# Patient Record
Sex: MALE | Race: BLACK OR AFRICAN AMERICAN | NOT HISPANIC OR LATINO | URBAN - METROPOLITAN AREA
[De-identification: names, ages, dates, MRNs, and addresses within clinical notes are randomized per-mention and may not be internally consistent; named-entity substitution may affect disease eponyms.]

---

## 2023-06-22 ENCOUNTER — APPOINTMENT (RX ONLY)
Dept: URBAN - METROPOLITAN AREA CLINIC 99 | Facility: CLINIC | Age: 58
Setting detail: DERMATOLOGY
End: 2023-06-22

## 2023-06-22 DIAGNOSIS — L29.8 OTHER PRURITUS: ICD-10-CM

## 2023-06-22 DIAGNOSIS — L81.0 POSTINFLAMMATORY HYPERPIGMENTATION: ICD-10-CM

## 2023-06-22 DIAGNOSIS — L20.89 OTHER ATOPIC DERMATITIS: ICD-10-CM | Status: INADEQUATELY CONTROLLED

## 2023-06-22 DIAGNOSIS — L29.89 OTHER PRURITUS: ICD-10-CM

## 2023-06-22 PROBLEM — L20.84 INTRINSIC (ALLERGIC) ECZEMA: Status: ACTIVE | Noted: 2023-06-22

## 2023-06-22 PROCEDURE — 99203 OFFICE O/P NEW LOW 30 MIN: CPT

## 2023-06-22 PROCEDURE — ? COUNSELING

## 2023-06-22 PROCEDURE — ? PRESCRIPTION MEDICATION MANAGEMENT

## 2023-06-22 PROCEDURE — ? PRESCRIPTION

## 2023-06-22 RX ORDER — PREDNISONE 10 MG/1
TABLET ORAL
Qty: 37 | Refills: 0 | Status: ERX | COMMUNITY
Start: 2023-06-22

## 2023-06-22 RX ORDER — TRIAMCINOLONE ACETONIDE 1 MG/G
OINTMENT TOPICAL BID
Qty: 453.6 | Refills: 1 | Status: ERX | COMMUNITY
Start: 2023-06-22

## 2023-06-22 RX ADMIN — TRIAMCINOLONE ACETONIDE: 1 OINTMENT TOPICAL at 00:00

## 2023-06-22 RX ADMIN — PREDNISONE: 10 TABLET ORAL at 00:00

## 2023-06-22 ASSESSMENT — SEVERITY ASSESSMENT 2020: SEVERITY 2020: MODERATE

## 2023-06-22 ASSESSMENT — LOCATION SIMPLE DESCRIPTION DERM
LOCATION SIMPLE: LEFT UPPER ARM
LOCATION SIMPLE: RIGHT SHOULDER
LOCATION SIMPLE: LEFT UPPER BACK
LOCATION SIMPLE: UPPER BACK
LOCATION SIMPLE: ABDOMEN
LOCATION SIMPLE: RIGHT THIGH
LOCATION SIMPLE: RIGHT UPPER ARM
LOCATION SIMPLE: CHEST
LOCATION SIMPLE: LEFT THIGH

## 2023-06-22 ASSESSMENT — LOCATION DETAILED DESCRIPTION DERM
LOCATION DETAILED: RIGHT MEDIAL SUPERIOR CHEST
LOCATION DETAILED: INFERIOR THORACIC SPINE
LOCATION DETAILED: RIGHT PROXIMAL POSTERIOR UPPER ARM
LOCATION DETAILED: RIGHT ANTERIOR PROXIMAL THIGH
LOCATION DETAILED: EPIGASTRIC SKIN
LOCATION DETAILED: LEFT MEDIAL SUPERIOR CHEST
LOCATION DETAILED: RIGHT POSTERIOR SHOULDER
LOCATION DETAILED: LEFT ANTERIOR PROXIMAL THIGH
LOCATION DETAILED: LEFT ANTERIOR DISTAL THIGH
LOCATION DETAILED: LEFT DISTAL POSTERIOR UPPER ARM
LOCATION DETAILED: LEFT LATERAL UPPER BACK

## 2023-06-22 ASSESSMENT — LOCATION ZONE DERM
LOCATION ZONE: ARM
LOCATION ZONE: TRUNK
LOCATION ZONE: LEG

## 2023-06-22 ASSESSMENT — ITCH NUMERIC RATING SCALE: HOW SEVERE IS YOUR ITCHING?: 10

## 2023-06-22 ASSESSMENT — BSA RASH: BSA RASH: 11

## 2023-06-22 ASSESSMENT — ITCH INTENSITY: HOW SEVERE IS YOUR ITCHING?: 10

## 2023-06-22 NOTE — PROCEDURE: PRESCRIPTION MEDICATION MANAGEMENT
Telephone Encounter by Kacey Skaggs RN at 07/27/17 10:16 AM     Author:  Kacey Skaggs RN Service:  (none) Author Type:  Registered Nurse     Filed:  07/27/17 10:16 AM Encounter Date:  7/24/2017 Status:  Signed     :  Kacey Skaggs RN (Registered Nurse)       From: Kailey Weber  To: Nena Ewing MD  Sent: 7/24/2017  6:51 AM CDT  Subject: Medication Renewal Request    Original authorizing provider: MD Kailey Alexander would like a refill of the following medications:  duloxetine (CYMBALTA) 20 MG Cap [Nena Ewing MD]    Preferred pharmacy: [facility] [address] [facility]    Comment:         Revision History        Date/Time User Provider Type Action    > 07/27/17 10:16 AM Kacey Skaggs RN Registered Nurse Sign    Attribution information within the note text is not available.            
Telephone Encounter by Kacey Skaggs 
Detail Level: Zone
Render In Strict Bullet Format?: No
Initiate Treatment: triamcinolone acetonide 0.1 % topical ointment BID\\nQuantity: 453.6 g  Days Supply: 30\\nSig: Apply to affected area bid prn for flareups. (Body)\\n\\nprednisone 10 mg tablets in a dose pack \\nQuantity: 37.0 Tablet\\nSig: Take 6 tablets day 1, 5 tablets day 2, 4 tablets day 3, 3.5 tablets day 4, 3 tablets day 5, 2.5 tablets days 6 and 7, 2 tablets days 8 and 9, 1.5 tablets days 10 and 11, 1 tablet days 12 and 13, 1/2 tablet days 14 and 15

## 2023-06-22 NOTE — HPI: RASH
What Type Of Note Output Would You Prefer (Optional)?: Standard Output
How Severe Is Your Rash?: moderate
Is This A New Presentation, Or A Follow-Up?: Rash
Additional History: Patient went to the ER first and they thought it was chicken pox. He then followed up with his PCP who diagnosed him with [place]. He has had basic blood allergy testing thinking he was allergic to penicillin but that came back negative. Also has STD testing, was also negative. He does take cool showers, use dove soap.\\n\\nPatient states the intense itching is effecting his sleep and his ability to properly focus at work.

## 2023-06-26 RX ORDER — TRIAMCINOLONE ACETONIDE 1 MG/G
OINTMENT TOPICAL BID
Qty: 453.6 | Refills: 1 | Status: ERX

## 2023-06-26 RX ORDER — PREDNISONE 10 MG/1
TABLET ORAL
Qty: 37 | Refills: 0 | Status: ERX

## 2023-07-20 ENCOUNTER — APPOINTMENT (RX ONLY)
Dept: URBAN - METROPOLITAN AREA CLINIC 99 | Facility: CLINIC | Age: 58
Setting detail: DERMATOLOGY
End: 2023-07-20

## 2023-07-20 DIAGNOSIS — L81.0 POSTINFLAMMATORY HYPERPIGMENTATION: ICD-10-CM

## 2023-07-20 DIAGNOSIS — B07.8 OTHER VIRAL WARTS: ICD-10-CM

## 2023-07-20 DIAGNOSIS — L259 CONTACT DERMATITIS AND OTHER ECZEMA, UNSPECIFIED CAUSE: ICD-10-CM | Status: RESOLVED

## 2023-07-20 PROBLEM — L23.9 ALLERGIC CONTACT DERMATITIS, UNSPECIFIED CAUSE: Status: ACTIVE | Noted: 2023-07-20

## 2023-07-20 PROCEDURE — ? ADDITIONAL NOTES

## 2023-07-20 PROCEDURE — ? TREATMENT REGIMEN

## 2023-07-20 PROCEDURE — 99213 OFFICE O/P EST LOW 20 MIN: CPT | Mod: 25

## 2023-07-20 PROCEDURE — 17110 DESTRUCTION B9 LES UP TO 14: CPT

## 2023-07-20 PROCEDURE — ? LIQUID NITROGEN

## 2023-07-20 PROCEDURE — ? COUNSELING

## 2023-07-20 ASSESSMENT — LOCATION SIMPLE DESCRIPTION DERM
LOCATION SIMPLE: LEFT UPPER ARM
LOCATION SIMPLE: RIGHT SHOULDER
LOCATION SIMPLE: LEFT THIGH
LOCATION SIMPLE: RIGHT THIGH
LOCATION SIMPLE: RIGHT UPPER ARM
LOCATION SIMPLE: ABDOMEN
LOCATION SIMPLE: UPPER BACK
LOCATION SIMPLE: LEFT UPPER BACK
LOCATION SIMPLE: CHEST

## 2023-07-20 ASSESSMENT — LOCATION DETAILED DESCRIPTION DERM
LOCATION DETAILED: LEFT LATERAL UPPER BACK
LOCATION DETAILED: RIGHT ANTERIOR PROXIMAL THIGH
LOCATION DETAILED: RIGHT POSTERIOR SHOULDER
LOCATION DETAILED: LEFT DISTAL POSTERIOR UPPER ARM
LOCATION DETAILED: LEFT ANTERIOR PROXIMAL THIGH
LOCATION DETAILED: RIGHT MEDIAL SUPERIOR CHEST
LOCATION DETAILED: LEFT ANTERIOR DISTAL THIGH
LOCATION DETAILED: RIGHT ANTERIOR MEDIAL PROXIMAL THIGH
LOCATION DETAILED: INFERIOR THORACIC SPINE
LOCATION DETAILED: EPIGASTRIC SKIN
LOCATION DETAILED: RIGHT PROXIMAL POSTERIOR UPPER ARM

## 2023-07-20 ASSESSMENT — LOCATION ZONE DERM
LOCATION ZONE: ARM
LOCATION ZONE: LEG
LOCATION ZONE: TRUNK

## 2023-07-20 NOTE — PROCEDURE: ADDITIONAL NOTES
Detail Level: Simple
Additional Notes: Patient clear today. PIH present post rash. Discussed possible patch testing if symptoms return and become more bothersome. Patient given Dr. Montague’s SAFElist.
Render Risk Assessment In Note?: no

## 2023-07-20 NOTE — PROCEDURE: LIQUID NITROGEN
Consent: The patient's consent was obtained including but not limited to risks of crusting, scabbing, blistering, scarring, darker or lighter pigmentary change, recurrence, incomplete removal and infection.
Medical Necessity Clause: This procedure was medically necessary because the lesions that were treated were:
Add 52 Modifier (Optional): no
Spray Paint Text: The liquid nitrogen was applied to the skin utilizing a spray paint frosting technique.
Show Applicator Variable?: Yes
Number Of Freeze-Thaw Cycles: 3 freeze-thaw cycles
Detail Level: Detailed
Post-Care Instructions: I reviewed with the patient in detail post-care instructions. Patient is to wear sunprotection, and avoid picking at any of the treated lesions. Pt may apply Vaseline to crusted or scabbing areas.
Duration Of Freeze Thaw-Cycle (Seconds): 10
Medical Necessity Information: It is in your best interest to select a reason for this procedure from the list below. All of these items fulfill various CMS LCD requirements except the new and changing color options.

## 2023-07-20 NOTE — PROCEDURE: TREATMENT REGIMEN
Detail Level: Zone
Continue Regimen: triamcinolone acetonide 0.1 % topical ointment: Apply to affected area bid prn for flares (body)